# Patient Record
Sex: MALE | Race: WHITE | NOT HISPANIC OR LATINO | Employment: FULL TIME | URBAN - METROPOLITAN AREA
[De-identification: names, ages, dates, MRNs, and addresses within clinical notes are randomized per-mention and may not be internally consistent; named-entity substitution may affect disease eponyms.]

---

## 2020-06-18 ENCOUNTER — HOSPITAL ENCOUNTER (EMERGENCY)
Facility: CLINIC | Age: 35
Discharge: HOME | End: 2020-06-18
Attending: FAMILY MEDICINE | Admitting: FAMILY MEDICINE

## 2020-06-18 VITALS
DIASTOLIC BLOOD PRESSURE: 82 MMHG | TEMPERATURE: 98.6 F | SYSTOLIC BLOOD PRESSURE: 107 MMHG | OXYGEN SATURATION: 97 % | RESPIRATION RATE: 16 BRPM | HEART RATE: 65 BPM

## 2020-06-18 DIAGNOSIS — J02.9 PHARYNGITIS, UNSPECIFIED ETIOLOGY: ICD-10-CM

## 2020-06-18 LAB
DEPRECATED S PYO AG THROAT QL EIA: NEGATIVE
SPECIMEN SOURCE: NORMAL
SPECIMEN SOURCE: NORMAL
STREP GROUP A PCR: NOT DETECTED

## 2020-06-18 PROCEDURE — 87651 STREP A DNA AMP PROBE: CPT | Performed by: FAMILY MEDICINE

## 2020-06-18 PROCEDURE — 99283 EMERGENCY DEPT VISIT LOW MDM: CPT | Performed by: FAMILY MEDICINE

## 2020-06-18 PROCEDURE — 99282 EMERGENCY DEPT VISIT SF MDM: CPT | Mod: Z6 | Performed by: FAMILY MEDICINE

## 2020-06-18 PROCEDURE — 40001204 ZZHCL STATISTIC STREP A RAPID: Performed by: FAMILY MEDICINE

## 2020-06-18 NOTE — ED AVS SNAPSHOT
[facility] Department  [address] [address]  Phone:  [phone]  Fax:  [phone]                                    Bro Heredia   MRN: [id number]    Department:  [facility] Emergency Department   Date of Visit:  6/18/2020           After Visit Summary Signature Page    I have received my discharge instructions, and my questions have been answered. I have discussed any challenges I see with this plan with the nurse or doctor.    ..........................................................................................................................................  Patient/Patient Representative Signature      ..........................................................................................................................................  Patient Representative Print Name and Relationship to Patient    ..................................................               ................................................  Date                                   Time    ..........................................................................................................................................  Reviewed by Signature/Title    ...................................................              ..............................................  Date                                               Time          22EPIC Rev 08/18

## 2020-06-18 NOTE — ED NOTES
Pt has sore throat that started Sunday, pain was worse then than today. Pt denies chills, fever, SOA, cough, has not been around anyone who is ill.

## 2020-06-18 NOTE — ED PROVIDER NOTES
HPI   The patient is a 35-year-old male presenting with sore throat since Sunday, 4 days ago.  The patient has had a sore throat since that time.  His discomfort is constant.  He has pain with swallowing.  He denies nasal congestion or rhinorrhea.  No postnasal drip is obvious.  No cough or congestion.  No ear pain.  No dental pain.  No headache.  No myalgia.  No fever.  No chest pain or shortness of breath.  No nausea or vomiting.  No diarrhea.  No skin rash.  No intraoral swelling.  No sores inside his mouth.  He does report having a tonsillectomy performed at 2 years of age.        Allergies:  No Known Allergies  Problem List:    There are no active problems to display for this patient.     Past Medical History:    History reviewed. No pertinent past medical history.  Past Surgical History:    History reviewed. No pertinent surgical history.  Family History:    No family history on file.  Social History:  Marital Status:    Social History     Tobacco Use     Smoking status: None   Substance Use Topics     Alcohol use: None     Drug use: None      Medications:    No current outpatient medications on file.    Review of Systems   All other systems reviewed and are negative.      PE   BP: 107/82  Pulse: 65  Temp: 98.6  F (37  C)  Resp: 16  SpO2: 97 %  Physical Exam  Vitals signs and nursing note reviewed.   Constitutional:       General: He is not in acute distress.     Appearance: He is not diaphoretic.   HENT:      Head: Atraumatic.      Right Ear: Tympanic membrane normal.      Left Ear: Tympanic membrane normal.      Nose: No congestion or rhinorrhea.      Mouth/Throat:      Mouth: Mucous membranes are moist.      Comments: Posterior oropharynx is mildly erythematous.  Uvula is midline.  Peritonsillar pillars are normal.  No exudate.  No evidence for dental caries or dental infection.  No evidence of intraoral ulcerations or lesions.  Eyes:      General: No scleral icterus.     Pupils: Pupils are equal, round,  and reactive to light.   Neck:      Musculoskeletal: Normal range of motion.   Cardiovascular:      Rate and Rhythm: Normal rate.   Pulmonary:      Effort: No respiratory distress.   Abdominal:      Palpations: Abdomen is soft.   Musculoskeletal: Normal range of motion.         General: No tenderness.   Skin:     General: Skin is warm.      Findings: No rash.   Neurological:      Mental Status: He is alert and oriented to person, place, and time.   Psychiatric:         Behavior: Behavior normal.         ED COURSE and [facility]   1757.  The patient has a sore throat.  Strep test pending.  Low concern for coronavirus.    1819.  Strep test negative.  The patient refuses Decadron.  Infectious etiology presumed.  Ibuprofen and Tylenol at home.  No antibiotics at this time.  Return for worsening.  Follow-up if not improved over the next 7 days.    LABS  Labs Ordered and Resulted from Time of ED Arrival Up to the Time of Departure from the ED   STREPTOCOCCUS A RAPID SCR W REFLX TO PCR   GROUP A STREPTOCOCCUS PCR THROAT SWAB       IMAGING  Images reviewed by me.  Radiology report also reviewed.  No orders to display       Procedures    Medications - No data to display      IMPRESSION       ICD-10-CM    1. Pharyngitis, unspecified etiology  J02.9             Medication List      There are no discharge medications for this visit.                       Emanuel Ritter MD  06/18/20 1817

## 2020-06-18 NOTE — DISCHARGE INSTRUCTIONS
Return to the Emergency Room if the following occurs:     Severely worsened pain, or for any concern at anytime.    Or, follow-up with the following provider as we discussed:     Return to your primary doctor as needed, or if not improved over the next 7 days.    Medications discussed:    Ibuprofen 600 mg every six hours for pain (7 days duration).  Tylenol 1000 mg every six hours for pain (7 days duration).  Therefore, you can alternate these every three hours and do it safely.    If you received pain-relieving or sedating medication during your time in the ER, avoid alcohol, driving automobiles, or working with machinery.  Also, a responsible adult must stay with you.        Call the Nurse Advice Line at [phone] or [phone] for any concern at anytime.

## 2024-01-30 VITALS
TEMPERATURE: 97.9 F | OXYGEN SATURATION: 97 % | RESPIRATION RATE: 18 BRPM | HEIGHT: 66 IN | SYSTOLIC BLOOD PRESSURE: 132 MMHG | WEIGHT: 150 LBS | DIASTOLIC BLOOD PRESSURE: 79 MMHG | HEART RATE: 50 BPM | BODY MASS INDEX: 24.11 KG/M2

## 2024-01-30 PROCEDURE — 99281 EMR DPT VST MAYX REQ PHY/QHP: CPT

## 2024-01-31 ENCOUNTER — HOSPITAL ENCOUNTER (EMERGENCY)
Facility: CLINIC | Age: 39
Discharge: LEFT BEFORE DISCHARGE | End: 2024-01-31
Admitting: EMERGENCY MEDICINE
Payer: COMMERCIAL

## 2024-01-31 ENCOUNTER — OFFICE VISIT (OUTPATIENT)
Dept: URGENT CARE | Facility: URGENT CARE | Age: 39
End: 2024-01-31
Payer: COMMERCIAL

## 2024-01-31 VITALS
TEMPERATURE: 97.9 F | BODY MASS INDEX: 23.57 KG/M2 | RESPIRATION RATE: 16 BRPM | WEIGHT: 146 LBS | OXYGEN SATURATION: 98 % | SYSTOLIC BLOOD PRESSURE: 132 MMHG | DIASTOLIC BLOOD PRESSURE: 79 MMHG | HEART RATE: 62 BPM

## 2024-01-31 DIAGNOSIS — R51.9 NONINTRACTABLE HEADACHE, UNSPECIFIED CHRONICITY PATTERN, UNSPECIFIED HEADACHE TYPE: Primary | ICD-10-CM

## 2024-01-31 LAB
ANION GAP SERPL CALCULATED.3IONS-SCNC: 10 MMOL/L (ref 7–15)
BASOPHILS # BLD AUTO: 0 10E3/UL (ref 0–0.2)
BASOPHILS NFR BLD AUTO: 1 %
BUN SERPL-MCNC: 17.7 MG/DL (ref 6–20)
CALCIUM SERPL-MCNC: 10 MG/DL (ref 8.6–10)
CHLORIDE SERPL-SCNC: 99 MMOL/L (ref 98–107)
CREAT SERPL-MCNC: 0.92 MG/DL (ref 0.67–1.17)
DEPRECATED HCO3 PLAS-SCNC: 28 MMOL/L (ref 22–29)
EGFRCR SERPLBLD CKD-EPI 2021: >90 ML/MIN/1.73M2
EOSINOPHIL # BLD AUTO: 0.2 10E3/UL (ref 0–0.7)
EOSINOPHIL NFR BLD AUTO: 2 %
ERYTHROCYTE [DISTWIDTH] IN BLOOD BY AUTOMATED COUNT: 11.4 % (ref 10–15)
GLUCOSE SERPL-MCNC: 98 MG/DL (ref 70–99)
HCT VFR BLD AUTO: 48.7 % (ref 40–53)
HGB BLD-MCNC: 16.6 G/DL (ref 13.3–17.7)
IMM GRANULOCYTES # BLD: 0 10E3/UL
IMM GRANULOCYTES NFR BLD: 0 %
LYMPHOCYTES # BLD AUTO: 2.4 10E3/UL (ref 0.8–5.3)
LYMPHOCYTES NFR BLD AUTO: 32 %
MCH RBC QN AUTO: 30.8 PG (ref 26.5–33)
MCHC RBC AUTO-ENTMCNC: 34.1 G/DL (ref 31.5–36.5)
MCV RBC AUTO: 90 FL (ref 78–100)
MONOCYTES # BLD AUTO: 0.5 10E3/UL (ref 0–1.3)
MONOCYTES NFR BLD AUTO: 7 %
NEUTROPHILS # BLD AUTO: 4.4 10E3/UL (ref 1.6–8.3)
NEUTROPHILS NFR BLD AUTO: 59 %
PLATELET # BLD AUTO: 223 10E3/UL (ref 150–450)
POTASSIUM SERPL-SCNC: 4.6 MMOL/L (ref 3.4–5.3)
RBC # BLD AUTO: 5.39 10E6/UL (ref 4.4–5.9)
SODIUM SERPL-SCNC: 137 MMOL/L (ref 135–145)
TSH SERPL DL<=0.005 MIU/L-ACNC: 0.79 UIU/ML (ref 0.3–4.2)
WBC # BLD AUTO: 7.5 10E3/UL (ref 4–11)

## 2024-01-31 PROCEDURE — 85025 COMPLETE CBC W/AUTO DIFF WBC: CPT

## 2024-01-31 PROCEDURE — 36415 COLL VENOUS BLD VENIPUNCTURE: CPT

## 2024-01-31 PROCEDURE — 99204 OFFICE O/P NEW MOD 45 MIN: CPT

## 2024-01-31 PROCEDURE — 80048 BASIC METABOLIC PNL TOTAL CA: CPT

## 2024-01-31 PROCEDURE — 84443 ASSAY THYROID STIM HORMONE: CPT

## 2024-01-31 RX ORDER — KETOROLAC TROMETHAMINE 30 MG/ML
30 INJECTION, SOLUTION INTRAMUSCULAR; INTRAVENOUS ONCE
Status: COMPLETED | OUTPATIENT
Start: 2024-01-31 | End: 2024-02-07

## 2024-01-31 RX ORDER — SUMATRIPTAN 6 MG/.5ML
6 INJECTION, SOLUTION SUBCUTANEOUS ONCE
Status: COMPLETED | OUTPATIENT
Start: 2024-01-31 | End: 2024-01-31

## 2024-01-31 RX ORDER — DIPHENHYDRAMINE HYDROCHLORIDE 50 MG/ML
25 INJECTION INTRAMUSCULAR; INTRAVENOUS ONCE
Status: COMPLETED | OUTPATIENT
Start: 2024-01-31 | End: 2024-01-31

## 2024-01-31 RX ADMIN — KETOROLAC TROMETHAMINE 30 MG: 30 INJECTION, SOLUTION INTRAMUSCULAR; INTRAVENOUS at 16:42

## 2024-01-31 NOTE — PROGRESS NOTES
URGENT CARE  Assessment & Plan   Assessment:   Bro Heredia is a 38 year old male who's clinical presentation today is consistent with:   1. Headache, acute  - CBC with platelets and differential; Future  - Basic metabolic panel  (Ca, Cl, CO2, Creat, Gluc, K, Na, BUN); Future  - TSH with free T4 reflex; Future  - Primary Care Referral; Future  Plan:  Patient declined abortive treatment in clinic today with Toradol, imitrex, benadryl, additionally he declined flu and covid testing. Patient's cbc was reassuring today and additional labs will come back tomorrow (BMP, thyroid)  Discussed with patient his symptoms are consistent with a migraine and recommend tylenol and ibuprofen at home along with darkened room, increasing hydration and cold packs.    We discussed if symptoms do not improve after starting today's treatment (or if symptoms worsen) to follow up in 1-2 days.  Additionally encouraged patient to follow up with their PCP regarding their headaches; also discussed with patient other nonpharmacological treatment options such as: lifestyle changes, acupuncture, and identifying and avoiding triggers - referral placed   Educated patient to monitor for progression to status migrainosus or the debilitating features of migraine attacks, and to present to ED if headache lasts >72 hours, educated patient to monitor for chest pain or difficulty breathing,  monitor for new numbness or weakness or changes to aura    No alarm signs or symptoms present   Differential Diagnoses for this patient's chief complaint that I considered include:  migraine, cluster headache, tension headache, sinus headache, temporal arteritis, ischemic event, brain mass/tumor, lesion, pseudotumor, meningitis, glaucoma, Neck trauma, hypertension, dehydration, infectious etiology      Patient  is} agreeable to treatment plan and state they will follow-up if symptoms do not improve and/or if symptoms worsen   see patient's AVS 'monitor for' section  for specific patient instructions given and discussed regarding what to watch for and when to follow up    GENARO Nguyen CNP  [facility]      ______________________________________________________________________      Subjective     HPI: Bro Heredia  is a 38 year old  male who presents today for evaluation the following concerns:   Patient presents today endorsing a headache which is located in the front of his head  Onset was a few days ago and the course is intermittent .   Patient denies a history of headaches, he denies any nausea, vomiting, photophobia, phonophobia, denies any auras ,   he denies any: visual field loss, blurry vision, changes in odor sensation, visual changes, numbness, and weakness.   Patient also denies any associated balance difficulty, weakness, recent head trauma, fever/chills, or other neurologic symptoms.  Patient states they have tried at home: acetaminophen, OTC NSAID's but helpfulness was minimal .   Patient states there are no associated abnormal neurological symptoms such as TIA's, loss of balance, loss of vision or speech, numbness or weakness on review.    Patient denies this is the worst headache ever, of maximum severity at onset, recent head trauma, fevers, or stiff neck.    Patient denies there are any focal neurologic symptoms or signs or cognitive changes.  Patient's past neurological history: negative for stroke, MS, epilepsy, or brain tumor.     Review of Systems:  Pertinent review of systems as reflected in HPI, otherwise negative.     Objective    Physical Exam:  Vitals:    01/31/24 1538   BP: 132/79   Pulse: 62   Resp: 16   Temp: 97.9  F (36.6  C)   TempSrc: Tympanic   SpO2: 98%   Weight: 66.2 kg (146 lb)     General: Alert and oriented, no acute distress, Vital signs reviewed: afebrile,  normotensive   Psy/mental status: Cooperative, nonanxious  SKIN: Intact, no rashes  EYES:  PEERLA, extra ocular motor intact without  discomfort  Eyes tracking, convergence normal, normal head shape: normocephalic, scalp/head/face non-tender  palpation,   No nausea, no dizziness noted   Conjunctiva: Clear bilaterally, no injection or erythema present  EARS: TMs intact, translucent gray in color with normal landmarks present no erythema  or bulging tympanic membrane   Canals are without swelling, however have a mild amount of cerumen, no impaction  NOSE:  mucosa  erythematous bilaterally with a mild amount of rhinorrhea, clear  discharge}               No frontal or maxillary sinus tenderness present bilaterally  MOUTH/THROAT: lips, tongue, & oral mucosa appear normal upon inspection                Posterior oropharynx is wnl   NECK: supple, has full range of motion with no meningeal signs              No lymphadenopathy present  LUNG: normal work of breathing, good respiratory effort without retractions, good air  movement, non labored, inspection reveals normal chest expansion w/  inspiration            Lung sounds are clear to auscultation bilaterally,            No rales/rhonic/crackles wheezing noted           No cough noted   Neuro:  motor, reflexes, cerebellar function, gait and coordination are all within normal  limits, no numbness noted, A&Ox4      LABS:   Results for orders placed or performed in visit on 01/31/24   CBC with platelets and differential     Status: None   Result Value Ref Range    WBC Count 7.5 4.0 - 11.0 10e3/uL    RBC Count 5.39 4.40 - 5.90 10e6/uL    Hemoglobin 16.6 13.3 - 17.7 g/dL    Hematocrit 48.7 40.0 - 53.0 %    MCV 90 78 - 100 fL    MCH 30.8 26.5 - 33.0 pg    MCHC 34.1 31.5 - 36.5 g/dL    RDW 11.4 10.0 - 15.0 %    Platelet Count 223 150 - 450 10e3/uL    % Neutrophils 59 %    % Lymphocytes 32 %    % Monocytes 7 %    % Eosinophils 2 %    % Basophils 1 %    % Immature Granulocytes 0 %    Absolute Neutrophils 4.4 1.6 - 8.3 10e3/uL    Absolute Lymphocytes 2.4 0.8 - 5.3 10e3/uL    Absolute Monocytes 0.5 0.0 - 1.3  10e3/uL    Absolute Eosinophils 0.2 0.0 - 0.7 10e3/uL    Absolute Basophils 0.0 0.0 - 0.2 10e3/uL    Absolute Immature Granulocytes 0.0 <=0.4 10e3/uL   CBC with platelets and differential     Status: None    Narrative    The following orders were created for panel order CBC with platelets and differential.  Procedure                               Abnormality         Status                     ---------                               -----------         ------                     CBC with platelets and d...[[id number]]                      Final result                 Please view results for these tests on the individual orders.        ______________________________________________________________________    I explained my diagnostic considerations and recommendations to the patient, who voiced understanding and agreement with the treatment plan.   All questions were answered.   We discussed potential side effects, risks and benefits of any prescribed or recommended therapies, as well as expectations for response to treatments.  Please see AVS for any patient instructions & handouts given.   Patient was advised to contact the Nurse Care Line, their Primary Care provider, Urgent Care, or the Emergency Department if there are new or worsening symptoms, or call 911 for emergencies.

## 2024-01-31 NOTE — PATIENT INSTRUCTIONS
Diagnosis:  headache   Today   Abortive treatment used in clinic   Declined flu and covid   Cbc - wnl    Bmp and thyroid will come back tomorrow     Plan:   Can continue with Toradol at home as needed   Follow up with pcp in next week for further evaluation and treatment   Cold packs, darkened room, sleeping, rest   Prevention work to identify triggers to help to avoid headaches   Monitor for:   headache is more intense or lasts longer than usual  medication doesn't provide relief  severe vomiting  Fevers     Migraine Headache  Migraine headaches are a strong type of headache. They can occur at any age, but usually start between the ages of 10 and 30.    If you have a migraine once, you will likely have another one at some point.  The exact cause of migraine headaches is not known. But the tendency to get them can run in families.  Sometimes, certain symptoms will tell you when a migraine is about to begin. This might happen hours or days before your headache begins.   This warning sign is a change in visual perception. This is called an aura.   The most common type of aura is a visual disturbance, like flashes of light, flickering lights, or a blind spot.   An aura usually lasts 10 to 30 minutes. The headache often follows within an hour.  Once the migraine headache begins, it can last from 4 to 72 hours.   The pain is usually throbbing and affects one side of the head.   During a migraine, many people have nausea and vomiting and are very sensitive to light and sound.    migraines are usually caused by a trigger;  Keep track of your migraines.   Write down the things that seem to cause them. This can help you find triggers.  Possible triggers include:    stress and other emotions, fatigue, glaring or flickering lights, and weather changes.  Certain foods and drinks are often linked to migraine attacks.   These include chocolate and red wine and well as cheese, onions, fatty foods, and acidic foods like oranges and  tomatoes.   Some of these foods are rich in tyramine. This is an amino acid that has been linked to migraines.  Avoiding triggers may reduce your chances of getting a migraine

## 2024-01-31 NOTE — ED TRIAGE NOTES
Fatigue, hot flashes, HA x 1 week. Afebrile. Denied SOB, coughing, CP, abd pain, urinary sx. NO OTC meds taking PTA. Pt refused covid swab.     Triage Assessment (Adult)       Row Name 01/30/24 2395          Triage Assessment    Airway WDL WDL        Respiratory WDL    Respiratory WDL WDL        Skin Circulation/Temperature WDL    Skin Circulation/Temperature WDL WDL        Cardiac WDL    Cardiac WDL WDL        Peripheral/Neurovascular WDL    Peripheral Neurovascular WDL WDL        Cognitive/Neuro/Behavioral WDL    Cognitive/Neuro/Behavioral WDL WDL

## 2024-02-01 ENCOUNTER — TELEPHONE (OUTPATIENT)
Dept: URGENT CARE | Facility: URGENT CARE | Age: 39
End: 2024-02-01
Payer: COMMERCIAL

## 2024-02-01 NOTE — NURSING NOTE
NOTE:  THE 3 MEDICATIONS LISTED IN THE 1/31/23 VISIT WITH JESSICA BARAHONA NP WERE NOT GIVEN, PT REFUSED DOSE.    1) KETOROLAC    2) DIPHENHYDRAMINE    3)SUMATRIPTAN    NONE OF THESE WERE GIVEN, HOWEVER CHART INDICATES GIVEN.    Leann Ramirez LPN

## 2024-02-09 ENCOUNTER — OFFICE VISIT (OUTPATIENT)
Dept: FAMILY MEDICINE | Facility: CLINIC | Age: 39
End: 2024-02-09
Payer: COMMERCIAL

## 2024-02-09 VITALS
HEART RATE: 64 BPM | TEMPERATURE: 97.8 F | SYSTOLIC BLOOD PRESSURE: 118 MMHG | BODY MASS INDEX: 24.11 KG/M2 | DIASTOLIC BLOOD PRESSURE: 64 MMHG | WEIGHT: 150 LBS | HEIGHT: 66 IN | OXYGEN SATURATION: 95 % | RESPIRATION RATE: 14 BRPM

## 2024-02-09 DIAGNOSIS — Z11.59 NEED FOR HEPATITIS C SCREENING TEST: ICD-10-CM

## 2024-02-09 DIAGNOSIS — Z13.220 SCREENING FOR LIPID DISORDERS: ICD-10-CM

## 2024-02-09 DIAGNOSIS — Z11.4 SCREENING FOR HIV (HUMAN IMMUNODEFICIENCY VIRUS): ICD-10-CM

## 2024-02-09 DIAGNOSIS — E55.9 VITAMIN D DEFICIENCY: ICD-10-CM

## 2024-02-09 DIAGNOSIS — R53.83 FATIGUE, UNSPECIFIED TYPE: ICD-10-CM

## 2024-02-09 DIAGNOSIS — R51.9 NONINTRACTABLE HEADACHE, UNSPECIFIED CHRONICITY PATTERN, UNSPECIFIED HEADACHE TYPE: Primary | ICD-10-CM

## 2024-02-09 LAB
CHOLEST SERPL-MCNC: 159 MG/DL
FASTING STATUS PATIENT QL REPORTED: NO
FERRITIN SERPL-MCNC: 148 NG/ML (ref 31–409)
HCV AB SERPL QL IA: NONREACTIVE
HDLC SERPL-MCNC: 38 MG/DL
HIV 1+2 AB+HIV1 P24 AG SERPL QL IA: NONREACTIVE
IRON BINDING CAPACITY (ROCHE): 259 UG/DL (ref 240–430)
IRON SATN MFR SERPL: 49 % (ref 15–46)
IRON SERPL-MCNC: 126 UG/DL (ref 61–157)
LDLC SERPL CALC-MCNC: 104 MG/DL
NONHDLC SERPL-MCNC: 121 MG/DL
TRIGL SERPL-MCNC: 87 MG/DL
VIT B12 SERPL-MCNC: 741 PG/ML (ref 232–1245)
VIT D+METAB SERPL-MCNC: 10 NG/ML (ref 20–50)

## 2024-02-09 PROCEDURE — 82306 VITAMIN D 25 HYDROXY: CPT | Performed by: STUDENT IN AN ORGANIZED HEALTH CARE EDUCATION/TRAINING PROGRAM

## 2024-02-09 PROCEDURE — 99213 OFFICE O/P EST LOW 20 MIN: CPT | Performed by: STUDENT IN AN ORGANIZED HEALTH CARE EDUCATION/TRAINING PROGRAM

## 2024-02-09 PROCEDURE — 83540 ASSAY OF IRON: CPT | Performed by: STUDENT IN AN ORGANIZED HEALTH CARE EDUCATION/TRAINING PROGRAM

## 2024-02-09 PROCEDURE — 83550 IRON BINDING TEST: CPT | Performed by: STUDENT IN AN ORGANIZED HEALTH CARE EDUCATION/TRAINING PROGRAM

## 2024-02-09 PROCEDURE — 82607 VITAMIN B-12: CPT | Performed by: STUDENT IN AN ORGANIZED HEALTH CARE EDUCATION/TRAINING PROGRAM

## 2024-02-09 PROCEDURE — 86665 EPSTEIN-BARR CAPSID VCA: CPT | Performed by: STUDENT IN AN ORGANIZED HEALTH CARE EDUCATION/TRAINING PROGRAM

## 2024-02-09 PROCEDURE — 86803 HEPATITIS C AB TEST: CPT | Performed by: STUDENT IN AN ORGANIZED HEALTH CARE EDUCATION/TRAINING PROGRAM

## 2024-02-09 PROCEDURE — 82728 ASSAY OF FERRITIN: CPT | Performed by: STUDENT IN AN ORGANIZED HEALTH CARE EDUCATION/TRAINING PROGRAM

## 2024-02-09 PROCEDURE — 80061 LIPID PANEL: CPT | Performed by: STUDENT IN AN ORGANIZED HEALTH CARE EDUCATION/TRAINING PROGRAM

## 2024-02-09 PROCEDURE — 36415 COLL VENOUS BLD VENIPUNCTURE: CPT | Performed by: STUDENT IN AN ORGANIZED HEALTH CARE EDUCATION/TRAINING PROGRAM

## 2024-02-09 PROCEDURE — 87389 HIV-1 AG W/HIV-1&-2 AB AG IA: CPT | Performed by: STUDENT IN AN ORGANIZED HEALTH CARE EDUCATION/TRAINING PROGRAM

## 2024-02-09 ASSESSMENT — PAIN SCALES - GENERAL: PAINLEVEL: NO PAIN (0)

## 2024-02-09 NOTE — PROGRESS NOTES
"  Assessment & Plan     Headache, acute  Patient is a pleasant 38 year old who presents for follow up form urgent care for headache. Headache has now improved but continues to have fatigue for the past few weeks. No signs of other illness at the time of onset of symptoms. Requesting \"vitamin levels\" No other neurologic symptoms to suggest concerning central process.   - Primary Care Referral    Fatigue, unspecified type  For fatigue, most likely associated with acute illness, despite lack of other symptoms. Low likelihood for tickborne at this time, though could consider lyme testing. For evaluation, as requested, b12, d, iron levels completed. We also discussed EBV as this could be associated with fatigue, does not have posterior cervical chain lymphadenopathy today. Vitamin d returned significantly low and will treat with high dose vitamin d supplementation. B12 and ferritin levels normal.   - Vitamin D deficiency screening  - Vitamin B12  - Ferritin  - Iron and iron binding capacity  - EBV Capsid Antibody IgM  - EBV Capsid Antibody IgG  - Vitamin D deficiency screening  - Vitamin B12  - Ferritin  - Iron and iron binding capacity  - EBV Capsid Antibody IgM  - EBV Capsid Antibody IgG    Screening for lipid disorders  Due for lipid screen, collected today.   - Lipid panel reflex to direct LDL Non-fasting  - Lipid panel reflex to direct LDL Non-fasting    Screening for HIV (human immunodeficiency virus)  Once in a lifetime screen.   - HIV Antigen Antibody Combo  - HIV Antigen Antibody Combo    Need for hepatitis C screening test  Once in a lifetime screen.   - Hepatitis C Screen Reflex to HCV RNA Quant and Genotype  - Hepatitis C Screen Reflex to HCV RNA Quant and Genotype    Vitamin d deficiency  Noted to have vitamin d of 10. Will treat with high dose.   -     vitamin D2 (ERGOCALCIFEROL) 65557 units (1250 mcg) capsule; Take 1 capsule (50,000 Units) by mouth once a week for 12 doses  -     vitamin D3 " "(CHOLECALCIFEROL) 50 mcg (2000 units) tablet; Take 1 tablet (50 mcg) by mouth daily    MED REC REQUIRED  Post Medication Reconciliation Status:  Patient was not discharged from an inpatient facility or TCU      David Crabtree is a 38 year old, presenting for the following health issues:  UC Follow-Up        2/9/2024    10:02 AM   Additional Questions   Roomed by Jocelin VYAS   Accompanied by Self     HPI       ED/UC Followup:    Facility:  [facility]  Date of visit: 01/31/2024  Reason for visit: Headache  Current Status: same- ongoing fatigue    Headache better - this has been off and on.     Vitamin levels?     No supplements    No recent illness.   Fatigue for 2 weeks.   No rashes    Headaches started about 2 weeks ago.     No numbness, tingling, weakness anywhere.   Maybe some numb sensation in the face.both   No vision changes.     No urinary or stool issues.     No injuries.   No new sexual partners        Review of Systems  Constitutional, HEENT, cardiovascular, pulmonary, gi and gu systems are negative, except as otherwise noted.      Objective    /64 (BP Location: Right arm, Patient Position: Sitting, Cuff Size: Adult Regular)   Pulse 64   Temp 97.8  F (36.6  C) (Tympanic)   Resp 14   Ht 1.676 m (5' 6\")   Wt 68 kg (150 lb)   SpO2 95%   BMI 24.21 kg/m    Body mass index is 24.21 kg/m .  Physical Exam  Constitutional:       Appearance: Normal appearance.   HENT:      Head: Normocephalic.   Eyes:      General: No scleral icterus.     Extraocular Movements: Extraocular movements intact.      Conjunctiva/sclera: Conjunctivae normal.   Cardiovascular:      Rate and Rhythm: Normal rate and regular rhythm.      Heart sounds: Normal heart sounds.   Pulmonary:      Effort: Pulmonary effort is normal.      Breath sounds: Normal breath sounds.   Lymphadenopathy:      Cervical: No cervical adenopathy.   Neurological:      General: No focal deficit present.      Mental Status: He is alert " and oriented to person, place, and time.           Results from this visit  Results for orders placed or performed in visit on 02/09/24   HIV Antigen Antibody Combo     Status: Normal   Result Value Ref Range    HIV Antigen Antibody Combo Nonreactive Nonreactive   Hepatitis C Screen Reflex to HCV RNA Quant and Genotype     Status: Normal   Result Value Ref Range    Hepatitis C Antibody Nonreactive Nonreactive   Lipid panel reflex to direct LDL Non-fasting     Status: Abnormal   Result Value Ref Range    Cholesterol 159 <200 mg/dL    Triglycerides 87 <150 mg/dL    Direct Measure HDL 38 (L) >=40 mg/dL    LDL Cholesterol Calculated 104 (H) <=100 mg/dL    Non HDL Cholesterol 121 <130 mg/dL    Patient Fasting > 8hrs? No     Narrative    Cholesterol  Desirable:  <200 mg/dL    Triglycerides  Normal:  Less than 150 mg/dL  Borderline High:  150-199 mg/dL  High:  200-499 mg/dL  Very High:  Greater than or equal to 500 mg/dL    Direct Measure HDL  Female:  Greater than or equal to 50 mg/dL   Male:  Greater than or equal to 40 mg/dL    LDL Cholesterol  Desirable:  <100mg/dL  Above Desirable:  100-129 mg/dL   Borderline High:  130-159 mg/dL   High:  160-189 mg/dL   Very High:  >= 190 mg/dL    Non HDL Cholesterol  Desirable:  130 mg/dL  Above Desirable:  130-159 mg/dL  Borderline High:  160-189 mg/dL  High:  190-219 mg/dL  Very High:  Greater than or equal to 220 mg/dL   Vitamin D deficiency screening     Status: Abnormal   Result Value Ref Range    Vitamin D, Total (25-Hydroxy) 10 (L) 20 - 50 ng/mL    Narrative    Season, race, dietary intake, and treatment affect the concentration of 25-hydroxy-Vitamin D. Values may decrease during winter months and increase during summer months.    Vitamin D determination is routinely performed by an immunoassay specific for 25 hydroxyvitamin D3.  If an individual is on vitamin D2(ergocalciferol) supplementation, please specify 25 OH vitamin D2 and D3 level determination by LCMSMS test  [id number].     Vitamin B12     Status: Normal   Result Value Ref Range    Vitamin B12 741 232 - 1,245 pg/mL   Ferritin     Status: Normal   Result Value Ref Range    Ferritin 148 31 - 409 ng/mL   Iron and iron binding capacity     Status: Abnormal   Result Value Ref Range    Iron 126 61 - 157 ug/dL    Iron Binding Capacity 259 240 - 430 ug/dL    Iron Sat Index 49 (H) 15 - 46 %     Results from last visit:  Office Visit on 01/31/2024   Component Date Value Ref Range Status    Sodium 01/31/2024 137  135 - 145 mmol/L Final    Reference intervals for this test were updated on 09/26/2023 to more accurately reflect our healthy population. There may be differences in the flagging of prior results with similar values performed with this method. Interpretation of those prior results can be made in the context of the updated reference intervals.     Potassium 01/31/2024 4.6  3.4 - 5.3 mmol/L Final    Chloride 01/31/2024 99  98 - 107 mmol/L Final    Carbon Dioxide (CO2) 01/31/2024 28  22 - 29 mmol/L Final    Anion Gap 01/31/2024 10  7 - 15 mmol/L Final    Urea Nitrogen 01/31/2024 17.7  6.0 - 20.0 mg/dL Final    Creatinine 01/31/2024 0.92  0.67 - 1.17 mg/dL Final    GFR Estimate 01/31/2024 >90  >60 mL/min/1.73m2 Final    Calcium 01/31/2024 10.0  8.6 - 10.0 mg/dL Final    Glucose 01/31/2024 98  70 - 99 mg/dL Final    TSH 01/31/2024 0.79  0.30 - 4.20 uIU/mL Final    WBC Count 01/31/2024 7.5  4.0 - 11.0 10e3/uL Final    RBC Count 01/31/2024 5.39  4.40 - 5.90 10e6/uL Final    Hemoglobin 01/31/2024 16.6  13.3 - 17.7 g/dL Final    Hematocrit 01/31/2024 48.7  40.0 - 53.0 % Final    MCV 01/31/2024 90  78 - 100 fL Final    MCH 01/31/2024 30.8  26.5 - 33.0 pg Final    MCHC 01/31/2024 34.1  31.5 - 36.5 g/dL Final    RDW 01/31/2024 11.4  10.0 - 15.0 % Final    Platelet Count 01/31/2024 223  150 - 450 10e3/uL Final    % Neutrophils 01/31/2024 59  % Final    % Lymphocytes 01/31/2024 32  % Final    % Monocytes 01/31/2024 7  % Final    %  Eosinophils 01/31/2024 2  % Final    % Basophils 01/31/2024 1  % Final    % Immature Granulocytes 01/31/2024 0  % Final    Absolute Neutrophils 01/31/2024 4.4  1.6 - 8.3 10e3/uL Final    Absolute Lymphocytes 01/31/2024 2.4  0.8 - 5.3 10e3/uL Final    Absolute Monocytes 01/31/2024 0.5  0.0 - 1.3 10e3/uL Final    Absolute Eosinophils 01/31/2024 0.2  0.0 - 0.7 10e3/uL Final    Absolute Basophils 01/31/2024 0.0  0.0 - 0.2 10e3/uL Final    Absolute Immature Granulocytes 01/31/2024 0.0  <=0.4 10e3/uL Final                 Signed Electronically by: Tracy Abdalla MD

## 2024-02-10 ENCOUNTER — HEALTH MAINTENANCE LETTER (OUTPATIENT)
Age: 39
End: 2024-02-10

## 2024-02-12 LAB
EBV VCA IGG SER IA-ACNC: >750 U/ML
EBV VCA IGG SER IA-ACNC: POSITIVE
EBV VCA IGM SER IA-ACNC: 44.6 U/ML
EBV VCA IGM SER IA-ACNC: ABNORMAL

## 2024-02-12 RX ORDER — CHOLECALCIFEROL (VITAMIN D3) 50 MCG
1 TABLET ORAL DAILY
Qty: 90 TABLET | Refills: 3 | Status: SHIPPED | OUTPATIENT
Start: 2024-04-30

## 2024-02-12 RX ORDER — ERGOCALCIFEROL 1.25 MG/1
50000 CAPSULE, LIQUID FILLED ORAL WEEKLY
Qty: 12 CAPSULE | Refills: 0 | Status: SHIPPED | OUTPATIENT
Start: 2024-02-12 | End: 2024-04-30

## 2024-02-13 ENCOUNTER — MYC MEDICAL ADVICE (OUTPATIENT)
Dept: FAMILY MEDICINE | Facility: CLINIC | Age: 39
End: 2024-02-13
Payer: COMMERCIAL

## 2025-03-08 ENCOUNTER — HEALTH MAINTENANCE LETTER (OUTPATIENT)
Age: 40
End: 2025-03-08